# Patient Record
Sex: FEMALE | Race: WHITE | HISPANIC OR LATINO
[De-identification: names, ages, dates, MRNs, and addresses within clinical notes are randomized per-mention and may not be internally consistent; named-entity substitution may affect disease eponyms.]

---

## 2018-08-08 ENCOUNTER — CHARTING TRANS (OUTPATIENT)
Dept: OTHER | Age: 53
End: 2018-08-08

## 2018-08-08 ENCOUNTER — LAB SERVICES (OUTPATIENT)
Dept: OTHER | Age: 53
End: 2018-08-08

## 2018-08-08 ASSESSMENT — PAIN SCALES - GENERAL: PAINLEVEL_OUTOF10: 0

## 2018-08-16 ENCOUNTER — CHARTING TRANS (OUTPATIENT)
Dept: OTHER | Age: 53
End: 2018-08-16

## 2018-08-16 LAB — PAP WITH HIGH RISK HPV: NORMAL

## 2018-08-22 ENCOUNTER — CHARTING TRANS (OUTPATIENT)
Dept: OTHER | Age: 53
End: 2018-08-22

## 2018-08-22 ENCOUNTER — LAB SERVICES (OUTPATIENT)
Dept: OTHER | Age: 53
End: 2018-08-22

## 2018-08-22 ASSESSMENT — PAIN SCALES - GENERAL: PAINLEVEL_OUTOF10: 0

## 2018-08-25 ENCOUNTER — CHARTING TRANS (OUTPATIENT)
Dept: OTHER | Age: 53
End: 2018-08-25

## 2018-08-25 LAB — SURGICAL PATHOLOGY: NORMAL

## 2018-08-30 ENCOUNTER — CHARTING TRANS (OUTPATIENT)
Dept: OTHER | Age: 53
End: 2018-08-30

## 2018-08-30 ASSESSMENT — PAIN SCALES - GENERAL: PAINLEVEL_OUTOF10: 0

## 2018-10-31 VITALS
DIASTOLIC BLOOD PRESSURE: 82 MMHG | SYSTOLIC BLOOD PRESSURE: 144 MMHG | WEIGHT: 170 LBS | HEIGHT: 64 IN | BODY MASS INDEX: 29.02 KG/M2

## 2018-11-27 VITALS
BODY MASS INDEX: 29.02 KG/M2 | DIASTOLIC BLOOD PRESSURE: 76 MMHG | HEIGHT: 64 IN | WEIGHT: 170 LBS | SYSTOLIC BLOOD PRESSURE: 118 MMHG

## 2018-11-27 VITALS
HEIGHT: 64 IN | BODY MASS INDEX: 29.02 KG/M2 | SYSTOLIC BLOOD PRESSURE: 120 MMHG | WEIGHT: 170 LBS | DIASTOLIC BLOOD PRESSURE: 78 MMHG

## 2019-02-26 ENCOUNTER — OFFICE VISIT (OUTPATIENT)
Dept: SURGERY | Facility: CLINIC | Age: 54
End: 2019-02-26
Payer: COMMERCIAL

## 2019-02-26 ENCOUNTER — TELEPHONE (OUTPATIENT)
Dept: SURGERY | Facility: CLINIC | Age: 54
End: 2019-02-26

## 2019-02-26 VITALS
BODY MASS INDEX: 28.17 KG/M2 | TEMPERATURE: 98 F | HEIGHT: 64 IN | HEART RATE: 81 BPM | WEIGHT: 165 LBS | DIASTOLIC BLOOD PRESSURE: 97 MMHG | SYSTOLIC BLOOD PRESSURE: 155 MMHG

## 2019-02-26 DIAGNOSIS — K43.9 VENTRAL HERNIA WITHOUT OBSTRUCTION OR GANGRENE: Primary | ICD-10-CM

## 2019-02-26 DIAGNOSIS — R10.33 PERIUMBILICAL ABDOMINAL PAIN: ICD-10-CM

## 2019-02-26 PROCEDURE — 99203 OFFICE O/P NEW LOW 30 MIN: CPT | Performed by: SURGERY

## 2019-02-26 RX ORDER — ALBUTEROL SULFATE 2.5 MG/3ML
3 SOLUTION RESPIRATORY (INHALATION)
COMMUNITY

## 2019-02-26 RX ORDER — FLUTICASONE PROPIONATE 50 MCG
SPRAY, SUSPENSION (ML) NASAL
Refills: 3 | COMMUNITY
Start: 2019-01-30

## 2019-02-26 RX ORDER — HYDROCORTISONE BUTYRATE 1 MG/G
CREAM TOPICAL
Refills: 1 | COMMUNITY
Start: 2018-12-11 | End: 2020-09-22

## 2019-02-26 RX ORDER — GABAPENTIN 100 MG/1
CAPSULE ORAL
Refills: 2 | COMMUNITY
Start: 2018-12-11

## 2019-02-26 RX ORDER — PIROXICAM 10 MG/1
CAPSULE ORAL
COMMUNITY
Start: 2011-12-22 | End: 2020-09-22

## 2019-02-26 NOTE — H&P
New Patient Visit Note       Active Problems      1. Ventral hernia without obstruction or gangrene    2. Periumbilical abdominal pain        Chief Complaint   Patient presents with:  Hernia: new pt. referred by insur.  co  - hernia issues , had surgery in 2 1  •  Piroxicam 10 MG Oral Cap, Take by mouth., Disp: , Rfl:       Review of Systems  The Review of Systems has been reviewed by me during today.   Review of Systems   Constitutional: Negative for chills, diaphoresis, fatigue, fever and unexpected weight ch etiology. Plan   · No palpable hernia by exam today. · Pt to undergo a CT of abd/pelvis to evaluate for a ventral hernia. · Follow-up after CT scan. · All questions answered. No orders of the defined types were placed in this encounter.       Imag

## 2019-02-26 NOTE — TELEPHONE ENCOUNTER
LM for patient that the CT scan that is ordered did not require prior authorization, she is able to call central scheduling to schedule appt. Advised her to follow up with Dr. Jag Joseph to review results once it's completed.

## 2019-03-05 ENCOUNTER — TELEPHONE (OUTPATIENT)
Dept: SURGERY | Facility: CLINIC | Age: 54
End: 2019-03-05

## 2019-03-05 NOTE — TELEPHONE ENCOUNTER
Patient returned call. Patient informed of results and verb understanding.   No further questions at this time

## 2019-03-05 NOTE — TELEPHONE ENCOUNTER
Called patient per RRP regarding CT results. - LMTCB  Per RRP patient does not have a hernia and PT can proceed with seeing plastics. Results placed in scanning.

## 2020-09-22 PROBLEM — M13.0 POLYARTHRITIS: Status: ACTIVE | Noted: 2020-09-22

## 2020-09-22 PROBLEM — I10 ESSENTIAL HYPERTENSION: Status: ACTIVE | Noted: 2020-09-22

## 2020-09-22 PROBLEM — T78.40XD ALLERGY, SUBSEQUENT ENCOUNTER: Status: ACTIVE | Noted: 2020-09-22

## 2020-09-22 PROBLEM — R10.33 PERIUMBILICAL ABDOMINAL PAIN: Status: RESOLVED | Noted: 2019-02-26 | Resolved: 2020-09-22

## 2020-09-22 PROBLEM — E66.09 CLASS 1 OBESITY DUE TO EXCESS CALORIES WITHOUT SERIOUS COMORBIDITY WITH BODY MASS INDEX (BMI) OF 30.0 TO 30.9 IN ADULT: Status: ACTIVE | Noted: 2020-09-22

## 2020-09-22 PROBLEM — E66.811 CLASS 1 OBESITY DUE TO EXCESS CALORIES WITHOUT SERIOUS COMORBIDITY WITH BODY MASS INDEX (BMI) OF 30.0 TO 30.9 IN ADULT: Status: ACTIVE | Noted: 2020-09-22

## 2020-10-16 PROBLEM — F41.9 ANXIETY DISORDER, UNSPECIFIED TYPE: Status: ACTIVE | Noted: 2020-10-16

## 2020-10-16 PROBLEM — E66.811 CLASS 1 OBESITY DUE TO EXCESS CALORIES WITHOUT SERIOUS COMORBIDITY WITH BODY MASS INDEX (BMI) OF 30.0 TO 30.9 IN ADULT: Status: RESOLVED | Noted: 2020-09-22 | Resolved: 2020-10-16

## 2020-10-16 PROBLEM — E66.09 CLASS 1 OBESITY DUE TO EXCESS CALORIES WITHOUT SERIOUS COMORBIDITY WITH BODY MASS INDEX (BMI) OF 30.0 TO 30.9 IN ADULT: Status: RESOLVED | Noted: 2020-09-22 | Resolved: 2020-10-16

## 2020-12-07 ENCOUNTER — OFFICE VISIT (OUTPATIENT)
Dept: OBGYN | Age: 55
End: 2020-12-07

## 2020-12-07 VITALS
SYSTOLIC BLOOD PRESSURE: 148 MMHG | TEMPERATURE: 99 F | DIASTOLIC BLOOD PRESSURE: 82 MMHG | HEIGHT: 64 IN | BODY MASS INDEX: 28.34 KG/M2 | WEIGHT: 166 LBS

## 2020-12-07 DIAGNOSIS — Z01.419 ENCOUNTER FOR ANNUAL ROUTINE GYNECOLOGICAL EXAMINATION: Primary | ICD-10-CM

## 2020-12-07 DIAGNOSIS — N91.1 SECONDARY AMENORRHEA: ICD-10-CM

## 2020-12-07 DIAGNOSIS — Z12.31 ENCOUNTER FOR SCREENING MAMMOGRAM FOR MALIGNANT NEOPLASM OF BREAST: ICD-10-CM

## 2020-12-07 PROCEDURE — 99396 PREV VISIT EST AGE 40-64: CPT | Performed by: OBSTETRICS & GYNECOLOGY

## 2020-12-07 RX ORDER — FLUTICASONE PROPIONATE 50 MCG
SPRAY, SUSPENSION (ML) NASAL
COMMUNITY
Start: 2020-10-16

## 2020-12-07 RX ORDER — ALPRAZOLAM 1 MG/1
1 TABLET ORAL NIGHTLY PRN
COMMUNITY

## 2020-12-07 RX ORDER — AMLODIPINE BESYLATE 5 MG/1
TABLET ORAL DAILY
COMMUNITY
Start: 2020-12-05 | End: 2023-10-06

## 2020-12-07 RX ORDER — ALBUTEROL SULFATE 90 UG/1
AEROSOL, METERED RESPIRATORY (INHALATION)
COMMUNITY

## 2020-12-07 RX ORDER — MULTIVITAMIN
1 TABLET ORAL
COMMUNITY

## 2020-12-07 RX ORDER — MONTELUKAST SODIUM 10 MG/1
10 TABLET ORAL NIGHTLY
COMMUNITY

## 2020-12-07 RX ORDER — ORPHENADRINE CITRATE 100 MG/1
100 TABLET, EXTENDED RELEASE ORAL
COMMUNITY

## 2020-12-07 RX ORDER — FEXOFENADINE HCL 180 MG/1
TABLET ORAL
COMMUNITY
Start: 1900-01-01

## 2020-12-07 SDOH — HEALTH STABILITY: MENTAL HEALTH: HOW OFTEN DO YOU HAVE A DRINK CONTAINING ALCOHOL?: MONTHLY OR LESS

## 2020-12-07 ASSESSMENT — PATIENT HEALTH QUESTIONNAIRE - PHQ9
SUM OF ALL RESPONSES TO PHQ9 QUESTIONS 1 AND 2: 0
SUM OF ALL RESPONSES TO PHQ9 QUESTIONS 1 AND 2: 0
2. FEELING DOWN, DEPRESSED OR HOPELESS: NOT AT ALL
1. LITTLE INTEREST OR PLEASURE IN DOING THINGS: NOT AT ALL
CLINICAL INTERPRETATION OF PHQ2 SCORE: NO FURTHER SCREENING NEEDED
CLINICAL INTERPRETATION OF PHQ9 SCORE: NO FURTHER SCREENING NEEDED

## 2021-01-07 ENCOUNTER — APPOINTMENT (OUTPATIENT)
Dept: OBGYN | Age: 56
End: 2021-01-07

## 2021-01-20 ENCOUNTER — APPOINTMENT (OUTPATIENT)
Dept: OBGYN | Age: 56
End: 2021-01-20

## 2021-02-04 ENCOUNTER — APPOINTMENT (OUTPATIENT)
Dept: OBGYN | Age: 56
End: 2021-02-04

## 2021-02-08 ENCOUNTER — APPOINTMENT (OUTPATIENT)
Dept: OBGYN | Age: 56
End: 2021-02-08

## 2021-02-22 ENCOUNTER — APPOINTMENT (OUTPATIENT)
Dept: OBGYN | Age: 56
End: 2021-02-22

## 2021-10-25 PROBLEM — I10 ESSENTIAL HYPERTENSION: Status: RESOLVED | Noted: 2020-09-22 | Resolved: 2021-10-25

## 2022-01-11 PROBLEM — Z83.3 FAMILY HISTORY OF DIABETES MELLITUS (DM): Status: ACTIVE | Noted: 2022-01-11

## 2022-01-11 PROBLEM — E66.3 OVERWEIGHT (BMI 25.0-29.9): Status: ACTIVE | Noted: 2022-01-11

## 2023-06-19 PROBLEM — Z12.4 SCREENING FOR CERVICAL CANCER: Status: ACTIVE | Noted: 2023-06-19

## 2023-06-19 PROBLEM — Z01.419 ENCOUNTER FOR GYNECOLOGICAL EXAMINATION WITHOUT ABNORMAL FINDING: Status: ACTIVE | Noted: 2023-06-19

## 2023-06-19 PROBLEM — E66.9 OBESITY: Status: ACTIVE | Noted: 2023-06-19

## 2023-06-19 PROBLEM — E78.2 MIXED HYPERLIPIDEMIA: Status: ACTIVE | Noted: 2022-10-27

## 2023-10-06 PROBLEM — T78.40XA ALLERGIES: Status: ACTIVE | Noted: 2020-09-22

## 2023-10-06 PROBLEM — M13.0 POLYARTHRITIS: Status: ACTIVE | Noted: 2020-09-22

## 2023-10-06 PROBLEM — Z83.3 FAMILY HISTORY OF DIABETES MELLITUS (DM): Status: ACTIVE | Noted: 2022-01-11

## 2023-10-06 PROBLEM — F41.9 ANXIETY DISORDER: Status: ACTIVE | Noted: 2020-10-16

## 2024-10-23 ENCOUNTER — OFFICE VISIT (OUTPATIENT)
Age: 59
End: 2024-10-23

## 2024-10-23 VITALS
BODY MASS INDEX: 30.45 KG/M2 | SYSTOLIC BLOOD PRESSURE: 152 MMHG | HEART RATE: 92 BPM | WEIGHT: 174 LBS | OXYGEN SATURATION: 97 % | DIASTOLIC BLOOD PRESSURE: 100 MMHG | HEIGHT: 63.25 IN | RESPIRATION RATE: 16 BRPM

## 2024-10-23 DIAGNOSIS — L29.9 PRURITUS: ICD-10-CM

## 2024-10-23 DIAGNOSIS — R05.3 CHRONIC COUGH: ICD-10-CM

## 2024-10-23 DIAGNOSIS — R09.82 PND (POST-NASAL DRIP): ICD-10-CM

## 2024-10-23 DIAGNOSIS — J30.89 NON-SEASONAL ALLERGIC RHINITIS, UNSPECIFIED TRIGGER: ICD-10-CM

## 2024-10-23 DIAGNOSIS — J32.9 CHRONIC SINUSITIS, UNSPECIFIED LOCATION: ICD-10-CM

## 2024-10-23 DIAGNOSIS — J45.40 MODERATE PERSISTENT ASTHMA, UNSPECIFIED WHETHER COMPLICATED (HCC): Primary | ICD-10-CM

## 2024-10-23 RX ORDER — EPINASTINE HCL 0.05 %
DROPS OPHTHALMIC (EYE)
COMMUNITY
Start: 2023-08-31

## 2024-10-23 RX ORDER — ALBUTEROL SULFATE 90 UG/1
INHALANT RESPIRATORY (INHALATION)
COMMUNITY
Start: 2024-08-05

## 2024-10-23 RX ORDER — HYDROCORTISONE 25 MG/G
CREAM TOPICAL 2 TIMES DAILY
COMMUNITY

## 2024-10-23 NOTE — PATIENT INSTRUCTIONS
Patient will schedule allergy skin test to aeroallergens-adult profile at our [place] allergy clinic on [address] [place], [place] [phone].    Patient will stop all allergy medications at least 5 days prior to allergy testing above.    Patient is interested in restarting traditional allergy shots.    Patient will follow-up after allergy testing completed.

## 2024-10-23 NOTE — PROGRESS NOTES
Karen Chowdhury is a 59 year old female.    HPI:     Chief Complaint   Patient presents with    Allergies     Here today for a f/u. States feeling good finished her allergy shots this month.      Patient is a 59-year-old female for lengthy follow-up.    Patient has a long history of uncontrolled moderate persistent asthma, perennial allergic rhinoconjunctivitis, chronic sinusitis, postnasal drainage, chronic cough    She has been on maintenance traditional allergy shot therapy in our clinic since 2008.  She has tried stopping allergy shots twice before in the past with eventual recurrence of her symptoms.  Patient received all her allergy shots 2 weeks ago, which completed her current batch.  Meanwhile patient controls her moderate asthma with Advair 45/21 2 puffs twice a day, plus albuterol inhaler 2 puffs every 4 hours as needed.  She controls her occasional chronic sinusitis with postnasal drainage with behind the counter 12-hour Allegra-D twice daily as needed.  She also uses Flonase nasal spray 2 sprays in each nostril daily for nasal stuffiness and postnasal drainage.  She also adds epinastine allergy eyedrops twice a day as needed.    Patient now wishes to repeat allergy skin test, for the purpose of probably restarting allergy shots in our [place] clinic in [facility].        HISTORY:  Past Medical History:    Anxiety disorder, unspecified type    Class 1 obesity due to excess calories without serious comorbidity with body mass index (BMI) of 30.0 to 30.9 in adult    Essential hypertension    Polyarthritis      Past Surgical History:   Procedure Laterality Date    Hernia surgery      2009, 2020 umbilical      Family History   Problem Relation Age of Onset    Other (alive and well) Mother 74    Diabetes Father 78      Social History:   Social History     Socioeconomic History    Marital status:     Number of children: 2   Occupational History    Occupation: unemployed   Tobacco Use    Smoking  status: Never    Smokeless tobacco: Never   Vaping Use    Vaping status: Never Used   Substance and Sexual Activity    Alcohol use: Not Currently    Drug use: Not Currently   Other Topics Concern    Exercise No    Seat Belt Yes     Service No     Social Drivers of Health     Food Insecurity: Low Risk  (6/25/2024)    Received from [facility]    Food Insecurity     Have there been times that your food ran out, and you didn't have money to get more?: No     Are there times that you worry that this might happen?: No   Transportation Needs: Low Risk  (6/25/2024)    Received from [facility]    Transportation Needs     Do you have trouble getting transportation to medical appointments?: No   Housing Stability: Low Risk  (6/25/2024)    Received from [facility]    Housing Stability     Are you worried that your electric, gas, oil, or water might be shut off?: No     Are you concerned about having a safe and reliable place to live?: No        Medications (Active prior to today's visit):  Current Outpatient Medications   Medication Sig Dispense Refill    Epinastine HCl 0.05 % Ophthalmic Solution       albuterol 108 (90 Base) MCG/ACT Inhalation Aero Soln       hydrocortisone 2.5 % External Cream Apply topically 2 (two) times daily. As directed      SERTRALINE 100 MG Oral Tab TAKE ONE TABLET BY MOUTH EVERY DAY 30 tablet 1    Orphenadrine Citrate  MG Oral Tablet 12 Hr Take 100 mg by mouth nightly as needed.      Multiple Vitamin (MULTI-VITAMIN DAILY) Oral Tab Take 1 tablet by mouth daily.      Fexofenadine HCl (ALLEGRA ALLERGY OR) Take by mouth. Over the counter      albuterol sulfate (2.5 MG/3ML) 0.083% Inhalation Nebu Soln Inhale 3 mL into the lungs. As needed      Fluticasone Propionate 50 MCG/ACT Nasal Suspension SHAKE LQ AND U 2 SPRAYS IEN D  3       Allergies:  Allergies[1]      ROS:   Review of Systems   Constitutional:  Negative for activity  change, appetite change, chills, diaphoresis, fatigue, fever and unexpected weight change.   HENT:  Positive for postnasal drip, rhinorrhea and sneezing. Negative for congestion, ear discharge, ear pain, facial swelling, hearing loss, mouth sores, sinus pressure, sinus pain, sore throat, tinnitus, trouble swallowing and voice change.    Eyes:  Positive for redness and itching. Negative for pain and discharge.   Respiratory:  Negative for apnea, cough, choking, chest tightness, shortness of breath, wheezing and stridor.    Cardiovascular:  Negative for chest pain and palpitations.   Gastrointestinal:  Negative for abdominal distention, abdominal pain, constipation, diarrhea, nausea and vomiting.   Endocrine: Negative for cold intolerance and heat intolerance.   Musculoskeletal:  Negative for arthralgias, joint swelling and myalgias.   Skin:  Negative for pallor and rash.   Allergic/Immunologic: Negative for environmental allergies, food allergies and immunocompromised state.   Neurological:  Negative for headaches.   Psychiatric/Behavioral:  Negative for behavioral problems and sleep disturbance.           PHYSICAL EXAM:   Physical Exam  Constitutional:       Appearance: She is normal weight.   HENT:      Head: No right periorbital erythema or left periorbital erythema.      Right Ear: Tympanic membrane normal. There is no impacted cerumen.      Left Ear: Tympanic membrane normal. There is no impacted cerumen.      Nose: No congestion or rhinorrhea.      Right Turbinates: Pale.      Left Turbinates: Pale.      Mouth/Throat:      Pharynx: No oropharyngeal exudate or posterior oropharyngeal erythema.   Eyes:      General: Lids are normal. No allergic shiner.        Right eye: No discharge.         Left eye: No discharge.      Conjunctiva/sclera:      Right eye: Right conjunctiva is injected. No exudate.     Left eye: Left conjunctiva is injected. No exudate.  Cardiovascular:      Rate and Rhythm: Normal rate and  regular rhythm.      Heart sounds: Normal heart sounds.   Pulmonary:      Effort: No tachypnea, prolonged expiration or respiratory distress.      Breath sounds: No stridor or decreased air movement. No decreased breath sounds, wheezing, rhonchi or rales.   Skin:     Coloration: Skin is not cyanotic or pale.      Findings: No acne, ecchymosis, erythema, petechiae or rash. Rash is not macular, nodular, papular, purpuric, pustular, scaling, urticarial or vesicular.           ASSESSMENT   Assessment   Encounter Diagnoses   Name Primary?    Moderate persistent asthma, unspecified whether complicated (HCC) Yes    Non-seasonal allergic rhinitis, unspecified trigger     Chronic sinusitis, unspecified location     PND (post-nasal drip)     Chronic cough     Pruritus        PLAN     Patient will schedule allergy skin test to aeroallergens-adult profile-in our [place] allergy clinic in [facility].  Patient will stop all allergy medications at least 5 days prior to allergy testing above.  We recommended scheduling the allergy test at least a month from the time she received her last allergy shots.    Patient is interested in restarting all her allergy shots at the [place] allergy clinic in [facility].    In near future patient will schedule simple pre and post pulmonary function test on her current medications.       Orders This Visit:  No orders of the defined types were placed in this encounter.      Meds This Visit:  Requested Prescriptions      No prescriptions requested or ordered in this encounter       Imaging & Referrals:  None     10/23/2024  Jeremias Shetty MD      If medication samples were provided today, they were provided solely for patient education and training related to self administration of these medications.  Teaching, instruction and sample was provided to the patient by myself.  Teaching included  a review of potential adverse side effects as well as potential efficacy.  Patient's questions  were answered in regards to medication administration and dosing and potential side effects. Teaching was provided via the teach back method       [1]   Allergies  Allergen Reactions    Amlodipine SWELLING    Diltiazem PALPITATIONS    Rye Grass Flower Pollen Extract  [Gramineae Pollens] OTHER (SEE COMMENTS)    Other OTHER (SEE COMMENTS), ITCHING and Runny nose

## 2024-12-18 ENCOUNTER — HOSPITAL ENCOUNTER (EMERGENCY)
Age: 59
End: 2024-12-18
Attending: STUDENT IN AN ORGANIZED HEALTH CARE EDUCATION/TRAINING PROGRAM

## 2024-12-18 PROCEDURE — 10004299 HB COUNTER-TRIAGE NO CHARGE

## 2024-12-18 SDOH — SOCIAL STABILITY: SOCIAL INSECURITY

## 2024-12-18 SDOH — SOCIAL STABILITY: SOCIAL INSECURITY: HOW OFTEN DOES ANYONE, INCLUDING FAMILY AND FRIENDS, THREATEN YOU WITH HARM?: NEVER

## 2025-01-13 ENCOUNTER — TELEPHONE (OUTPATIENT)
Facility: CLINIC | Age: 60
End: 2025-01-13

## 2025-01-13 NOTE — TELEPHONE ENCOUNTER
Left recorded message for patient. Allergy testing is done at the [place] location.  You must be off allergy meds 5 days prior.

## 2025-02-03 ENCOUNTER — TELEPHONE (OUTPATIENT)
Facility: CLINIC | Age: 60
End: 2025-02-03

## 2025-02-03 DIAGNOSIS — T78.40XD ALLERGY, SUBSEQUENT ENCOUNTER: Primary | ICD-10-CM

## 2025-02-03 DIAGNOSIS — R05.3 CHRONIC COUGH: ICD-10-CM

## 2025-02-03 DIAGNOSIS — J45.40 MODERATE PERSISTENT ASTHMA, UNSPECIFIED WHETHER COMPLICATED (HCC): ICD-10-CM

## 2025-02-03 RX ORDER — ALBUTEROL SULFATE 90 UG/1
2 INHALANT RESPIRATORY (INHALATION) EVERY 6 HOURS PRN
Qty: 1 EACH | Refills: 2 | Status: SHIPPED | OUTPATIENT
Start: 2025-02-03

## 2025-03-05 ENCOUNTER — TELEPHONE (OUTPATIENT)
Dept: ALLERGY | Facility: CLINIC | Age: 60
End: 2025-03-05

## 2025-03-05 LAB
(T11) IGE: <0.1 KU/L
(T8) IGE: <0.1 KU/L
ALTERNARIA ALTERNATA (M6) IGE: <0.1 KU/L
ASPERGILLUS FUMIGATUS (M3) IGE: <0.1 KU/L
BERMUDA GRASS (G2) IGE: <0.1 KU/L
CAT DANDER (E1) IGE: 2.17 KU/L
CLADOSPORIUM HERBARUM (M2) IGE: <0.1 KU/L
CLASS: 0
CLASS: 1
CLASS: 2
COCKROACH (I6) IGE: 0.21 KU/L
COMMON RAGWEED (SHORT)$(W1) IGE: <0.1 KU/L
COTTONWOOD (T14) IGE: <0.1 KU/L
DERMATOPHAGOIDES FARINAE (D2) IGE: 0.3 KU/L
DERMATOPHAGOIDES PTERONYSSINUS (D1) IGE: 0.36 KU/L
DOG DANDER (E5) IGE: <0.1 KU/L
FEL D 1 (E94) IGE: 0.46 KU/L
FEL D 2 (E220) IGE: <0.1 KU/L
FEL D 4 (E228) IGE: 0.29 KU/L
FEL D 7 (E231) IGE: 0.58 KU/L
HICKORY/PECAN TREE (T22)$IGE: <0.1 KU/L
IMMUNOGLOBULIN E: 220 KU/L
MAPLE (BOX ELDER) (T1)$IGE: <0.1 KU/L
MOUNTAIN CEDAR (T6) IGE: <0.1 KU/L
MOUSE URINE PROTEINS (E72) IGE: <0.1 KU/L
OAK (T7) IGE: <0.1 KU/L
PENICILLIUM NOTATUM (M1) IGE: <0.1 KU/L
ROUGH MARSH ELDER (W16)$IGE: <0.1 KU/L
ROUGH PIGWEED (W14) IGE: <0.1 KU/L
RUSSIAN THISTLE (W11) IGE: <0.1 KU/L
TIMOTHY GRASS (G6) IGE: <0.1 KU/L
WALNUT TREE (T10) IGE: <0.1 KU/L
WHITE ASH (T15) IGE: <0.1 KU/L
WHITE MULBERRY (T70) IGE: <0.1 KU/L

## 2025-03-05 NOTE — TELEPHONE ENCOUNTER
RN left message to please call office back to go over test results listed below  Left office hours and call back number

## 2025-03-05 NOTE — TELEPHONE ENCOUNTER
This just validates that she is pretty allergic to cats.  Okay to inform patient if she is interested.  Thanks

## 2025-03-05 NOTE — TELEPHONE ENCOUNTER
CAT DANDER COMPONENT PANEL  Order: [id number]   Collected 3/3/2025  9:42 AM       Status: Final result    0 Result Notes      Component  Ref Range & Units 3/3/25  9:42 AM   FEL D 1 (E94) IGE  <0.10 kU/L 0.46 High    FEL D 2 (E220) IGE  <0.10 kU/L <0.10   FEL D 4 (E228) IGE  <0.10 kU/L 0.29 High    FEL D 7 (E231) IGE  <0.10 kU/L 0.58 High    Comment:    Component testing for samples with positive extract  results may help to rule out cross-reactivity and confirm  that allergy is present. The more components a patient is  sensitized to, the higher the likelihood of a reaction  when exposed to cats.          Dr. Shetty please advise on above lab result.

## 2025-03-05 NOTE — TELEPHONE ENCOUNTER
----- Message from Jeremias Shetty sent at 3/4/2025  3:25 PM CST -----  Okay to call patient and inform her that the blood test showed allergies to dust mites and cat dander    Dog dander resulted negative.   Allergy zone 8 also showed a cockroach allergy at .021.   Total IgE level elevated at 220    Dr. Shetty please advise on Cat Dander Component panel result, and the other results listed above prior to RN calling patient. Thank you

## 2025-03-05 NOTE — TELEPHONE ENCOUNTER
Yes, okay dimension negative dog dander allergy; extremely borderline mild cockroach allergy; and total serum IgE elevated.    Thanks, ALESSIO

## 2025-03-06 ENCOUNTER — MED REC SCAN ONLY (OUTPATIENT)
Facility: CLINIC | Age: 60
End: 2025-03-06

## 2025-03-18 NOTE — TELEPHONE ENCOUNTER
Left a second message for patient to please contact our office to discuss test results per Dr. Shetty.

## 2025-03-19 NOTE — TELEPHONE ENCOUNTER
Per message from the phone room listed below  Patient ok to wait until appointment on 3/26/25 to go over test results   Patient's appointment is on 3/26/25 at 1:30 pm  No further action required at this time

## 2025-04-02 ENCOUNTER — OFFICE VISIT (OUTPATIENT)
Facility: CLINIC | Age: 60
End: 2025-04-02

## 2025-04-02 DIAGNOSIS — J30.1 SEASONAL ALLERGIC RHINITIS DUE TO POLLEN: ICD-10-CM

## 2025-04-02 DIAGNOSIS — J30.89 NON-SEASONAL ALLERGIC RHINITIS DUE TO FUNGAL SPORES: Primary | ICD-10-CM

## 2025-04-02 DIAGNOSIS — J45.20 MILD INTERMITTENT ASTHMA, UNSPECIFIED WHETHER COMPLICATED (HCC): ICD-10-CM

## 2025-04-02 DIAGNOSIS — J32.9 CHRONIC SINUSITIS, UNSPECIFIED LOCATION: ICD-10-CM

## 2025-04-02 DIAGNOSIS — R06.09 DYSPNEA ON EXERTION: ICD-10-CM

## 2025-04-02 RX ORDER — EPINEPHRINE 0.3 MG/.3ML
0.3 INJECTION SUBCUTANEOUS
COMMUNITY
Start: 2023-12-09

## 2025-04-02 NOTE — PATIENT INSTRUCTIONS
Patient will schedule allergy skin test to aeroallergens-adult profile-[place] region at our [place] allergy clinic in [facility] in July/August 2025.  She will stop all allergy medications 5 days prior to the skin testing.    Patient will probably proceed with allergy shots depending on the outcome of the test above.    Meanwhile, patient will try over-the-counter 24 Allegra in the morning; but add over-the-counter 24-hour Xyzal or Zyrtec at bedtime; 1/2 to 1 tablet.

## 2025-04-02 NOTE — PROGRESS NOTES
Karen Chowdhury is a 59 year old female.    HPI:     Chief Complaint   Patient presents with    Allergies     Patient here to go over allergy lab results.     Patient is a 59-year-old female for follow-up on recent laboratory tests.    Patient recently obtained RAST testing to aeroallergens.  She was only positive to dust mites, cat dander, cockroach.    Previous allergy skin testing in my [facility] allergy practice in 2006 were positive to tree, grass, weed pollen; mold spores; dust mites; cat and dog dander.  She had been receiving traditional allergy shots for those allergens for many years.    Now, off of her allergy shots, patient's allergic rhinoconjunctivitis/chronic sinusitis/asthma symptoms have progressively worsened in frequency and severity.        HISTORY:  Past Medical History:    Anxiety disorder, unspecified type    Class 1 obesity due to excess calories without serious comorbidity with body mass index (BMI) of 30.0 to 30.9 in adult    Essential hypertension    Polyarthritis      Past Surgical History:   Procedure Laterality Date    Hernia surgery      2009, 2020 umbilical      Family History   Problem Relation Age of Onset    Other (alive and well) Mother 74    Diabetes Father 78      Social History:   Social History     Socioeconomic History    Marital status:     Number of children: 2   Occupational History    Occupation: unemployed   Tobacco Use    Smoking status: Never     Passive exposure: Never    Smokeless tobacco: Never   Vaping Use    Vaping status: Never Used   Substance and Sexual Activity    Alcohol use: Not Currently    Drug use: Not Currently   Other Topics Concern    Exercise No    Seat Belt Yes     Service No     Social Drivers of Health     Food Insecurity: Low Risk  (6/25/2024)    Received from [facility]    Food Insecurity     Have there been times that your food ran out, and you didn't have money to get more?: No     Are there  times that you worry that this might happen?: No   Transportation Needs: Low Risk  (6/25/2024)    Received from [facility]    Transportation Needs     Do you have trouble getting transportation to medical appointments?: No   Housing Stability: Low Risk  (6/25/2024)    Received from [facility]    Housing Stability     Are you worried that your electric, gas, oil, or water might be shut off?: No     Are you concerned about having a safe and reliable place to live?: No        Medications (Active prior to today's visit):  Current Outpatient Medications   Medication Sig Dispense Refill    EPINEPHrine (AUVI-Q) 0.3 MG/0.3ML Injection Solution Auto-injector Inject 0.3 mL (1 each total) into the muscle.      PREDNISONE OR Take by mouth.      albuterol 108 (90 Base) MCG/ACT Inhalation Aero Soln Inhale 2 puffs into the lungs every 6 (six) hours as needed for Wheezing. inhale 2 puff by inhalation route  every 4 - 6 hours as needed 1 each 2    Epinastine HCl 0.05 % Ophthalmic Solution       hydrocortisone 2.5 % External Cream Apply topically 2 (two) times daily. As directed      SERTRALINE 100 MG Oral Tab TAKE ONE TABLET BY MOUTH EVERY DAY 30 tablet 1    Orphenadrine Citrate  MG Oral Tablet 12 Hr Take 100 mg by mouth nightly as needed.      Multiple Vitamin (MULTI-VITAMIN DAILY) Oral Tab Take 1 tablet by mouth daily.      Fexofenadine HCl (ALLEGRA ALLERGY OR) Take by mouth. Over the counter      albuterol sulfate (2.5 MG/3ML) 0.083% Inhalation Nebu Soln Inhale 3 mL into the lungs. As needed      Fluticasone Propionate 50 MCG/ACT Nasal Suspension SHAKE LQ AND U 2 SPRAYS IEN D  3       Allergies:  Allergies[1]      ROS:   Review of Systems   Constitutional:  Negative for activity change, appetite change, chills, diaphoresis, fatigue, fever and unexpected weight change.   HENT:  Positive for congestion, postnasal drip, rhinorrhea and sneezing. Negative for ear discharge, ear pain,  facial swelling, hearing loss, mouth sores, sinus pressure, sinus pain, sore throat, tinnitus, trouble swallowing and voice change.    Eyes:  Positive for redness and itching. Negative for pain and discharge.   Respiratory:  Positive for shortness of breath and wheezing. Negative for apnea, cough, choking, chest tightness and stridor.    Cardiovascular:  Negative for chest pain and palpitations.   Gastrointestinal:  Negative for abdominal distention, abdominal pain, constipation, diarrhea, nausea and vomiting.   Endocrine: Negative for cold intolerance and heat intolerance.   Musculoskeletal:  Negative for arthralgias, joint swelling and myalgias.   Skin:  Negative for pallor and rash.   Allergic/Immunologic: Positive for environmental allergies. Negative for food allergies and immunocompromised state.   Neurological:  Negative for headaches.   Psychiatric/Behavioral:  Negative for behavioral problems and sleep disturbance.           PHYSICAL EXAM:   Physical Exam  Constitutional:       Appearance: She is normal weight.   HENT:      Head: No right periorbital erythema or left periorbital erythema.      Right Ear: Tympanic membrane normal. There is no impacted cerumen.      Left Ear: Tympanic membrane normal. There is no impacted cerumen.      Nose: No congestion or rhinorrhea.      Mouth/Throat:      Pharynx: No oropharyngeal exudate or posterior oropharyngeal erythema.   Eyes:      General: Lids are normal. No allergic shiner.        Right eye: No discharge.         Left eye: No discharge.      Conjunctiva/sclera: Conjunctivae normal.      Right eye: Right conjunctiva is not injected. No exudate.     Left eye: Left conjunctiva is not injected. No exudate.  Cardiovascular:      Rate and Rhythm: Normal rate and regular rhythm.      Heart sounds: Normal heart sounds.   Pulmonary:      Effort: No tachypnea, prolonged expiration or respiratory distress.      Breath sounds: No stridor or decreased air movement. No  decreased breath sounds, wheezing, rhonchi or rales.   Skin:     Coloration: Skin is not cyanotic or pale.      Findings: No acne, ecchymosis, erythema, petechiae or rash. Rash is not macular, nodular, papular, purpuric, pustular, scaling, urticarial or vesicular.           ASSESSMENT   Assessment   Encounter Diagnoses   Name Primary?    Non-seasonal allergic rhinitis due to fungal spores Yes    Seasonal allergic rhinitis due to pollen     Chronic sinusitis, unspecified location     Mild intermittent asthma, unspecified whether complicated (HCC)     Dyspnea on exertion        PLAN     In July/August 2025, patient will schedule allergy skin test to aeroallergens-adult profile-[place] region at [facility] allergy clinic in [facility].       Orders This Visit:  No orders of the defined types were placed in this encounter.      Meds This Visit:  Requested Prescriptions      No prescriptions requested or ordered in this encounter       Imaging & Referrals:  None     4/2/2025  Jeremias Shetty MD      If medication samples were provided today, they were provided solely for patient education and training related to self administration of these medications.  Teaching, instruction and sample was provided to the patient by myself.  Teaching included  a review of potential adverse side effects as well as potential efficacy.  Patient's questions were answered in regards to medication administration and dosing and potential side effects. Teaching was provided via the teach back method       [1]   Allergies  Allergen Reactions    Amlodipine SWELLING    Diltiazem PALPITATIONS    Rye Grass Flower Pollen Extract  [Gramineae Pollens] OTHER (SEE COMMENTS)    Other OTHER (SEE COMMENTS), ITCHING and Runny nose

## 2025-08-04 ENCOUNTER — HOSPITAL ENCOUNTER (EMERGENCY)
Age: 60
Discharge: HOME | End: 2025-08-04

## 2025-08-04 VITALS
HEIGHT: 64 IN | BODY MASS INDEX: 25.67 KG/M2 | RESPIRATION RATE: 20 BRPM | OXYGEN SATURATION: 99 % | WEIGHT: 150.35 LBS | DIASTOLIC BLOOD PRESSURE: 87 MMHG | TEMPERATURE: 97.5 F | HEART RATE: 80 BPM | SYSTOLIC BLOOD PRESSURE: 172 MMHG

## 2025-08-04 PROCEDURE — 93005 ELECTROCARDIOGRAM TRACING: CPT | Performed by: EMERGENCY MEDICINE

## 2025-08-04 PROCEDURE — 99282 EMERGENCY DEPT VISIT SF MDM: CPT

## 2025-08-06 LAB
ATRIAL RATE (BPM): 79
P AXIS (DEGREES): 40
PR-INTERVAL (MSEC): 152
QRS-INTERVAL (MSEC): 78
QT-INTERVAL (MSEC): 436
QTC: 499
R AXIS (DEGREES): 42
REPORT TEXT: NORMAL
T AXIS (DEGREES): 11
VENTRICULAR RATE EKG/MIN (BPM): 79